# Patient Record
Sex: FEMALE | HISPANIC OR LATINO | URBAN - METROPOLITAN AREA
[De-identification: names, ages, dates, MRNs, and addresses within clinical notes are randomized per-mention and may not be internally consistent; named-entity substitution may affect disease eponyms.]

---
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
## 2025-03-04 ENCOUNTER — OFFICE VISIT (OUTPATIENT)
Dept: OBGYN CLINIC | Facility: CLINIC | Age: 20
End: 2025-03-04
Payer: COMMERCIAL

## 2025-03-04 VITALS
DIASTOLIC BLOOD PRESSURE: 64 MMHG | WEIGHT: 112.81 LBS | HEIGHT: 63 IN | BODY MASS INDEX: 19.99 KG/M2 | HEART RATE: 67 BPM | SYSTOLIC BLOOD PRESSURE: 106 MMHG

## 2025-03-04 DIAGNOSIS — Z30.46 ENCOUNTER FOR REMOVAL AND REINSERTION OF ETONOGESTREL IMPLANT: Primary | ICD-10-CM

## 2025-03-04 DIAGNOSIS — Z71.85 HPV VACCINE COUNSELING: ICD-10-CM

## 2025-03-04 PROCEDURE — 11983 REMOVE/INSERT DRUG IMPLANT: CPT | Performed by: STUDENT IN AN ORGANIZED HEALTH CARE EDUCATION/TRAINING PROGRAM

## 2025-03-04 PROCEDURE — 99395 PREV VISIT EST AGE 18-39: CPT | Performed by: STUDENT IN AN ORGANIZED HEALTH CARE EDUCATION/TRAINING PROGRAM

## 2025-03-04 NOTE — PROCEDURES
Date of Procedure: 25    Procedure: Nexplanon Removal. Nexplanon insertion     Pre-procedure diagnosis:  Request Nexplanon Removal   Request Nexplanon Insertion    Post-procedure diagnosis:   Status post Nexplanon removal  Status post Nexplanon insertion     Indications:   19 year old year old female  who presents for Nexplanon removal and placement of new Nexplanon. No LMP recorded. Patient has had an implant.     Procedure details:  The patient was counseled on various methods of contraception. The patient requested removal of Nexplanon Implant and insertion of new Nexplanon implant. The procedure, risks, benefits and alternatives were discussed with the patient. The patient was informed of risks including but not limited to the risk of bleeding, infection, injury and irregular bleeding. All questions and concerns were addressed. The patient provided verbal and written consent.     The Nexplanon implant was palpated along patient's left arm about 8-10 cm from the medial epicondyle and the implant was positioned towards the skin. The targeted excision site was then cleaned, prepped and infiltrated with 3 mL 1% lidocaine for adequate anesthesia. A small incision was made with scalpel over the end of the implant. Sterile shelton clamp was used to grasp and move the implant towards the incision. The scalpel was used to release the capsule along the implant. The implant was then subsequently removed from the patient's arm and shown to the patient. Confirmation of the removal of an intact implant was made by the patient and physician.     A new Nexplanon  implant was prepared. The transparent cap was removed and the applicator was examined to confirm the presence of the Nexplanon device. The Nexplanon applicator was then brought towards the target insertion site and used to puncture the skin with the tip of the needle at about a 30 degree angle. The Nexplanon applicator was then lowered to a horizontal  position and slowly advanced proximal to the insertion site until the full length of needle was noted to be just under the skin. The Nexplanon applicator was then deployed and subsequently removed. The implant was then palpated and verified to be in the correct position. A sterile adhesive bandage was placed over the site of insertion. The patient was instructed on palpation of her Nexplanon implant. A pressure bandage with sterile guaze was then applied. The patient was instructed to keep the pressure bandage in place for 24 hours. The patient tolerated the procedure well. The patient was provided with the Nexplanon user card. Does not need to use additional contraception given immediate replacement. The patient verbalized understanding.     Disposition: Stable    Complications: None    Follow up: in one year for WWE and first pap smear

## 2025-03-04 NOTE — PROGRESS NOTES
[facility]  Obstetrics and Gynecology   History & Physical    Chief complaint:   Chief Complaint   Patient presents with    Wellness Visit     Annual Exam   Reviewed Preventative/Wellness form with patient.     Procedure     Nexplanon Removal and re-insertion         HPI: Francoise Fair is a 19 year old  with No LMP recorded. Patient has had an implant.      Last seen 2022 in our clinic. Had nexplanon insertion 2022. Patient has history of ITP, follows with hematology. Has recent IV iron, Hgb & platelet count normal. Had a period of irregular bleeding with nexplanon and was offered OCP x 3 months to see if this helps stabilize - pt stopped taking it after a bit due to side effects (nausea, etc) but the bleeding got better. She has not had a period on this since then. Would like replacement today.     Pap Result Notes: under 21 years old    PMHx/Surghx/FamHx reviewed, no changes.      PCP: Antonia Laguna MD    Last pap: <20 yo  HPV vaccine: unsure - will check   Sexual history: Active? yes, male partner(s)  Dnies history of STDs.   Birth control? nexplanon  Reproductive life plan? Future   Family history of gynecologic cancers? denies      Review of Systems:  Constitutional:  Denies fatigue, night sweats, hot flashes  Eyes:  denies blurred or double vision  Cardiovascular:  denies chest pain or palpitations  Respiratory:  denies shortness of breath  Gastrointestinal:  denies heartburn, abdominal pain, diarrhea or constipation  Genitourinary:  denies dysuria, incontinence, abnormal vaginal discharge, vaginal itching  Musculoskeletal:  denies back pain.  Skin/Breast:  Denies any breast pain, lumps, or discharge.   Neurological:  denies headaches, extremity weakness or numbness.  Psychiatric: denies depression or anxiety.  Endocrine:   denies excessive thirst or urination.  Heme/Lymph:  denies history of anemia, easy bruising or bleeding.    OB History:  OB History    Para Term   AB Living   0 0 0 0 0 0   SAB IAB Ectopic Multiple Live Births   0 0 0 0 0       Meds:  Medications Ordered Prior to Encounter[1]    All:  Allergies[2]    PMH:  Past Medical History:    Heart murmur       PSH:  Past Surgical History:   Procedure Laterality Date    Other      pda ligation    Repr pda by ligatn      1 WEEK OLD       Social History:  Social History     Socioeconomic History    Marital status: Single     Spouse name: Not on file    Number of children: Not on file    Years of education: Not on file    Highest education level: Not on file   Occupational History    Not on file   Tobacco Use    Smoking status: Never    Smokeless tobacco: Never   Vaping Use    Vaping status: Never Used   Substance and Sexual Activity    Alcohol use: Never    Drug use: Never    Sexual activity: Yes     Partners: Male     Birth control/protection: Implant   Other Topics Concern    Not on file   Social History Narrative    Not on file     Social Drivers of Health     Food Insecurity: Not on file   Transportation Needs: Not on file   Stress: Not on file   Housing Stability: Not on file        Family History:  Family History   Problem Relation Age of Onset    No Known Problems Father     No Known Problems Mother     Diabetes Maternal Grandfather        PHYSICAL EXAM:     Vitals:    25 1154   BP: 106/64   Pulse: 67   Weight: 112 lb 12.8 oz (51.2 kg)   Height: 63\"       Body mass index is 19.98 kg/m².      Constitutional: well developed, well nourished  Head/Face: normocephalic  Skin/Hair: no unusual rashes or bruises  Extremities: no edema, no cyanosis  Psychiatric:  Oriented to time, place, person and situation. Appropriate mood and affect    Pelvic Exam: deferred due to age and no complaints    Assessment & Plan:     Francoise Fair is a 19 year old      Diagnoses and all orders for this visit:    Encounter for removal and reinsertion of etonogestrel implant  -     REMOVAL W/ REINSERTION, SUB-DERMAL  CONTRACEPTIVE IMPLANT [[id number]]  -     Etonogestrel IMPL 1 each         Healthcare maintenance:  Pap: age 21    HPV vaccine: will check if she received it  Contraception: nexplanon replaced today   Mammogram, age 40  Colonoscopy, age 45  DEXA, age 65        Marcella Marrero MD   [facility] - OBGYN         [1]   Current Outpatient Medications on File Prior to Visit   Medication Sig Dispense Refill    Etonogestrel (NEXPLANON) 68 MG Subcutaneous Implant Inject into the skin.      Norgestimate-Eth Estradiol (SPRINTEC 28) 0.25-35 MG-MCG Oral Tab Take 1 tablet by mouth daily. (Patient not taking: Reported on 3/4/2025) 84 tablet 0     No current facility-administered medications on file prior to visit.   [2] No Known Allergies

## (undated) NOTE — LETTER
Francoise COX Fair, :3/5/2005    CONSENT FOR PROCEDURE/SEDATION    1. I authorize the performance upon Francoise COX Fair  the following: Removal and reinsertion of Nexplanon    2. I authorize Dr. Marcella Marrero MD (and whomever is designated as the doctor’s assistant), to perform the above-mentioned procedures.    3. If any unforeseen conditions arise during this procedure calling for additional  procedures, operations, or medications (including anesthesia and blood transfusion), I further request and authorize the doctor to do whatever he/she deems advisable in my interest.    4. I consent to the taking and reproduction of any photographs in the course of this procedure for professional purposes.    5. I consent to the administration of such sedation as may be considered necessary or advisable by the physician responsible for this service, with the exception of ______________________________________________________    6. I have been informed by my doctor of the nature and purpose of this procedure sedation, possible alternative methods of treatment, risk involved and possible complications.    7. If I have a Do Not Resuscitate (DNR) order in place, the physician and I (or the individual authorized to consent on my behalf) will discuss and agree as to whether the Do Not Resuscitate (DNR) order will remain in effect or will be discontinued during the performance of the procedure and the applicable recovery period. If the Do Not Resuscitate (DNR) order is discontinued and is to be reinstated following the procedure/recovery period, the physician will determine when the applicable recovery period ends for purposes of reinstating the Do Not Resuscitate (DNR) order.    Signature of Patient:_______________________________________________    Signature of person authorized to consent for patient:  _______________________________________________________________    Relationship to patient:  ____________________________________________    Witness: _________________________________________ Date:___________     Physician Signature: _______________________________ Date:___________

## (undated) NOTE — LETTER
[facility] [place] Department of OB/GYN  After Care Instructions for Nexplanon       Bleeding    You may experience irregular bleeding for the first 3-6 months.   If your bleeding becomes heavier than a normal menstrual cycle, please contact our office.        Pain   You may experience mild pain to the arm typically lasting 24-48hrs.  You may use Ibuprofen, Aleve and Tylenol to relieve the discomfort.   If you experience severe or persistent pain contact our office.      Restrictions    A bandage was placed on your arm to cover the site where the Nexplanon was inserted.  Leave the larger bandage on for 12 hours and keep the smaller bandage clean, dry, and in place for 24-48 hours.      When to contact our office   If you are experiencing discomfort described as worse than sore pain to your arm that's not relieved after taking Ibuprofen.  Fever of 100.4 or greater with increase swelling or redness to your arm.  Vaginal bleeding that is saturating 1 pad per hour.      If you have additional questions or concerns, please call us at [phone].